# Patient Record
Sex: FEMALE | Race: BLACK OR AFRICAN AMERICAN | NOT HISPANIC OR LATINO | Employment: FULL TIME | URBAN - METROPOLITAN AREA
[De-identification: names, ages, dates, MRNs, and addresses within clinical notes are randomized per-mention and may not be internally consistent; named-entity substitution may affect disease eponyms.]

---

## 2020-01-23 PROBLEM — N10 ACUTE PYELONEPHRITIS: Status: ACTIVE | Noted: 2020-01-23

## 2020-01-23 PROBLEM — N10 ACUTE PYELONEPHRITIS: Status: RESOLVED | Noted: 2020-01-23 | Resolved: 2020-01-23

## 2020-01-23 PROBLEM — R74.01 TRANSAMINITIS: Status: ACTIVE | Noted: 2020-01-23

## 2020-01-23 PROBLEM — N39.0 RECURRENT UTI: Status: ACTIVE | Noted: 2020-01-23

## 2020-01-23 PROBLEM — R79.89 ELEVATED SERUM CREATININE: Status: ACTIVE | Noted: 2020-01-23

## 2020-01-23 PROBLEM — N20.0 NEPHROLITHIASIS: Chronic | Status: ACTIVE | Noted: 2020-01-23

## 2020-01-23 PROBLEM — N12 PYELONEPHRITIS: Status: ACTIVE | Noted: 2020-01-23

## 2020-01-23 PROBLEM — E87.1 HYPONATREMIA: Status: ACTIVE | Noted: 2020-01-23

## 2020-01-24 PROBLEM — E87.1 HYPONATREMIA: Status: RESOLVED | Noted: 2020-01-23 | Resolved: 2020-01-24

## 2020-01-25 PROBLEM — R79.89 ELEVATED SERUM CREATININE: Status: RESOLVED | Noted: 2020-01-23 | Resolved: 2020-01-25

## 2020-01-26 PROBLEM — R50.9 FEVER: Status: ACTIVE | Noted: 2020-01-26

## 2020-01-27 PROBLEM — N10 ACUTE PYELONEPHRITIS: Status: ACTIVE | Noted: 2020-01-27

## 2020-01-29 PROBLEM — D50.9 IRON DEFICIENCY ANEMIA: Status: ACTIVE | Noted: 2020-01-29

## 2020-02-07 PROBLEM — N12 PYELONEPHRITIS: Status: RESOLVED | Noted: 2020-01-23 | Resolved: 2020-02-07

## 2020-02-07 PROBLEM — N39.0 RECURRENT UTI: Status: RESOLVED | Noted: 2020-01-23 | Resolved: 2020-02-07

## 2020-02-07 PROBLEM — N11.8 XGP (XANTHOGRANULOMATOUS PYELONEPHRITIS): Status: ACTIVE | Noted: 2020-02-07

## 2020-02-07 PROBLEM — N10 ACUTE PYELONEPHRITIS: Status: RESOLVED | Noted: 2020-01-27 | Resolved: 2020-02-07

## 2020-02-08 ENCOUNTER — NURSE TRIAGE (OUTPATIENT)
Dept: ADMINISTRATIVE | Facility: CLINIC | Age: 40
End: 2020-02-08

## 2020-02-09 NOTE — TELEPHONE ENCOUNTER
Patient reports she is having vaginal bleeding but takes the depo and usually doesn't have a cycle but every 3 months. Patient also states she recently had a kidney stint place a couple months ago. Patient was advised per protocol and was advised to call back with any questions/concerns or symptoms. Patient verbalized understanding.     Reason for Disposition   [1] Bleeding or spotting between regular periods AND [2] occurs more than two cycles (2 months) AND [3] using birth control medicine (pills, patch, Depo-Provera, Implanon, vaginal ring, Mirena IUD)    Additional Information   Negative: Shock suspected (e.g., cold/pale/clammy skin, too weak to stand, low BP, rapid pulse)   Negative: Difficult to awaken or acting confused  (e.g., disoriented, slurred speech)   Negative: Passed out (i.e., lost consciousness, collapsed and was not responding)   Negative: Sounds like a life-threatening emergency to the triager   Negative: Followed a genital area injury   Negative: Pregnant > 20 weeks  (5 months or more)   Negative: Pregnant < 20 weeks  (less than 5 months)   Negative: Bleeding occurring > 12 months after menopause   Negative: Bleeding from sexual abuse or rape   Negative: [1] Vaginal discharge is main symptom AND [2] small amount of blood   Negative: SEVERE abdominal pain   Negative: SEVERE dizziness (e.g., unable to stand, requires support to walk, feels like passing out now)   Negative: SEVERE vaginal bleeding (i.e., soaking 2 pads or tampons per hour and present 2 or more hours)   Negative: Patient sounds very sick or weak to the triager   Negative: MODERATE vaginal bleeding (i.e., soaking 1 pad or tampon per hour and present > 6 hours)   Negative: [1] Constant abdominal pain AND [2] present > 2 hours   Negative: Pale skin (pallor) of new onset or worsening   Negative: Passed tissue (e.g., gray-white)   Negative: Taking Coumadin (warfarin) or other strong blood thinner, or known bleeding  "disorder (e.g., thrombocytopenia)   Negative: [1] Skin bruises or nosebleed AND [2] not caused by an injury   Negative: [1] Periods with > 6 soaked pads or tampons per day AND [2] last > 7 days   Negative: [1] Bleeding or spotting after procedure (e.g., biopsy) or pelvic examination (e.g., pap smear) AND [2] lasts > 7 days   Negative: [1] Bleeding or spotting occurs after sex (Exception: first intercourse) AND [2] lasts > 7 days   Negative: Periods with > 6 soaked pads or tampons per day   Negative: Periods last > 7 days   Negative: [1] Missed period AND [2] has occurred 2 or more times in the last year   Negative: [1] Menstrual cycle < 21 days OR > 35 days AND [2] occurs more than two cycles (2 months) this past year   Negative: [1] Bleeding or spotting between regular periods AND [2] occurs more than two cycles (2 months) this past year    Answer Assessment - Initial Assessment Questions  1. AMOUNT: "Describe the bleeding that you are having."     - SPOTTING: spotting, or pinkish / brownish mucous discharge; does not fill panti-liner or pad     - MILD:  less than 1 pad / hour; less than patient's usual menstrual bleeding    - MODERATE: 1-2 pads / hour; small-medium blood clots (e.g., pea, grape, small coin)     - SEVERE: soaking 2 or more pads/hour for 2 or more hours; bleeding not contained by pads or continuous red blood from vagina; large blood clots (e.g., golf ball, large coin)       mild  2. ONSET: "When did the bleeding begin?" "Is it continuing now?"      today  3. MENSTRUAL PERIOD: "When was the last normal menstrual period?" "How is this different than your period?"     January 13,2020  4. REGULARITY: "How regular are your periods?"      Every 3 months  5. ABDOMINAL PAIN: "Do you have any pain?" "How bad is the pain?"  (e.g., Scale 1-10; mild, moderate, or severe)    - MILD (1-3): doesn't interfere with normal activities, abdomen soft and not tender to touch     - MODERATE (4-7): interferes with " "normal activities or awakens from sleep, tender to touch     - SEVERE (8-10): excruciating pain, doubled over, unable to do any normal activities       none  6. PREGNANCY: "Could you be pregnant?" "Are you sexually active?"      no  7. BREASTFEEDING: "Are you breastfeeding?"      no  8. HORMONES: "Are you taking any hormone medications, prescription or OTC?" (e.g., birth control pills, estrogen)      Depo provera  9. BLOOD THINNERS: "Do you take any blood thinners?" (e.g., Coumadin/warfarin, Pradaxa/dabigatran, aspirin)      none  10. CAUSE: "What do you think is causing the bleeding?" (e.g., recent gyn surgery, recent gyn procedure; known bleeding disorder, cervical cancer, polycystic ovarian disease, fibroids)         possible  1. HEMODYNAMIC STATUS: "Are you weak or feeling lightheaded?" If so, ask: "Can you stand and walk normally?"         no  12. OTHER SYMPTOMS: "What other symptoms are you having with the bleeding?" (e.g., passed tissue, vaginal discharge, fever, menstrual-type cramps)       none    Protocols used: ST VAGINAL BLEEDING - [id number]      "

## 2020-03-05 PROBLEM — N39.0 RECURRENT UTI: Status: ACTIVE | Noted: 2020-03-05
